# Patient Record
Sex: MALE | Race: BLACK OR AFRICAN AMERICAN | NOT HISPANIC OR LATINO | Employment: UNEMPLOYED | URBAN - METROPOLITAN AREA
[De-identification: names, ages, dates, MRNs, and addresses within clinical notes are randomized per-mention and may not be internally consistent; named-entity substitution may affect disease eponyms.]

---

## 2017-07-22 ENCOUNTER — HOSPITAL ENCOUNTER (EMERGENCY)
Facility: HOSPITAL | Age: 57
Discharge: HOME | End: 2017-07-23
Attending: EMERGENCY MEDICINE
Payer: MEDICAID

## 2017-07-22 DIAGNOSIS — R53.1 WEAKNESS: ICD-10-CM

## 2017-07-22 DIAGNOSIS — R91.1 INCIDENTAL LUNG NODULE: ICD-10-CM

## 2017-07-22 DIAGNOSIS — K59.01 SLOW TRANSIT CONSTIPATION: Primary | ICD-10-CM

## 2017-07-22 DIAGNOSIS — R11.10 EMESIS: ICD-10-CM

## 2017-07-22 LAB
ALBUMIN SERPL BCP-MCNC: 3.6 G/DL
ALLENS TEST: ABNORMAL
ALP SERPL-CCNC: 77 U/L
ALT SERPL W/O P-5'-P-CCNC: 10 U/L
ANION GAP SERPL CALC-SCNC: 15 MMOL/L
AST SERPL-CCNC: 11 U/L
B-OH-BUTYR BLD STRIP-SCNC: 3.8 MMOL/L
BASOPHILS # BLD AUTO: 0 K/UL
BASOPHILS NFR BLD: 0 %
BILIRUB SERPL-MCNC: 1.3 MG/DL
BUN SERPL-MCNC: 31 MG/DL
CALCIUM SERPL-MCNC: 10.1 MG/DL
CHLORIDE SERPL-SCNC: 100 MMOL/L
CO2 SERPL-SCNC: 24 MMOL/L
CREAT SERPL-MCNC: 2 MG/DL
DELSYS: ABNORMAL
DIFFERENTIAL METHOD: ABNORMAL
EOSINOPHIL # BLD AUTO: 0 K/UL
EOSINOPHIL NFR BLD: 0.3 %
ERYTHROCYTE [DISTWIDTH] IN BLOOD BY AUTOMATED COUNT: 14.5 %
EST. GFR  (AFRICAN AMERICAN): 41 ML/MIN/1.73 M^2
EST. GFR  (NON AFRICAN AMERICAN): 35 ML/MIN/1.73 M^2
GLUCOSE SERPL-MCNC: 399 MG/DL
HCO3 UR-SCNC: 28.3 MMOL/L (ref 24–28)
HCT VFR BLD AUTO: 39.3 %
HCT VFR BLD CALC: 43 %PCV (ref 36–54)
HGB BLD-MCNC: 13.2 G/DL
LYMPHOCYTES # BLD AUTO: 2.5 K/UL
LYMPHOCYTES NFR BLD: 36.6 %
MCH RBC QN AUTO: 24 PG
MCHC RBC AUTO-ENTMCNC: 33.6 G/DL
MCV RBC AUTO: 71 FL
MODE: ABNORMAL
MONOCYTES # BLD AUTO: 0.5 K/UL
MONOCYTES NFR BLD: 7 %
NEUTROPHILS # BLD AUTO: 3.8 K/UL
NEUTROPHILS NFR BLD: 56.1 %
PCO2 BLDA: 52.6 MMHG (ref 35–45)
PH SMN: 7.34 [PH] (ref 7.35–7.45)
PLATELET # BLD AUTO: 178 K/UL
PMV BLD AUTO: 11.1 FL
PO2 BLDA: 22 MMHG (ref 40–60)
POC BE: 1 MMOL/L
POC IONIZED CALCIUM: 1.32 MMOL/L (ref 1.06–1.42)
POC SATURATED O2: 33 % (ref 95–100)
POC TCO2: 30 MMOL/L (ref 24–29)
POTASSIUM BLD-SCNC: 4.8 MMOL/L (ref 3.5–5.1)
POTASSIUM SERPL-SCNC: 4.9 MMOL/L
PROT SERPL-MCNC: 7.5 G/DL
RBC # BLD AUTO: 5.51 M/UL
SAMPLE: ABNORMAL
SITE: ABNORMAL
SODIUM BLD-SCNC: 139 MMOL/L (ref 136–145)
SODIUM SERPL-SCNC: 139 MMOL/L
TROPONIN I SERPL DL<=0.01 NG/ML-MCNC: 0.02 NG/ML
WBC # BLD AUTO: 6.7 K/UL

## 2017-07-22 PROCEDURE — 93010 ELECTROCARDIOGRAM REPORT: CPT | Mod: ,,, | Performed by: INTERNAL MEDICINE

## 2017-07-22 PROCEDURE — 82962 GLUCOSE BLOOD TEST: CPT

## 2017-07-22 PROCEDURE — 99285 EMERGENCY DEPT VISIT HI MDM: CPT | Mod: 25

## 2017-07-22 PROCEDURE — 80053 COMPREHEN METABOLIC PANEL: CPT

## 2017-07-22 PROCEDURE — 84484 ASSAY OF TROPONIN QUANT: CPT

## 2017-07-22 PROCEDURE — 25000003 PHARM REV CODE 250: Performed by: EMERGENCY MEDICINE

## 2017-07-22 PROCEDURE — 85025 COMPLETE CBC W/AUTO DIFF WBC: CPT

## 2017-07-22 PROCEDURE — 96361 HYDRATE IV INFUSION ADD-ON: CPT

## 2017-07-22 PROCEDURE — 96374 THER/PROPH/DIAG INJ IV PUSH: CPT

## 2017-07-22 PROCEDURE — 82010 KETONE BODYS QUAN: CPT

## 2017-07-22 PROCEDURE — 93005 ELECTROCARDIOGRAM TRACING: CPT

## 2017-07-22 PROCEDURE — 63600175 PHARM REV CODE 636 W HCPCS: Performed by: EMERGENCY MEDICINE

## 2017-07-22 PROCEDURE — 82803 BLOOD GASES ANY COMBINATION: CPT

## 2017-07-22 RX ORDER — METFORMIN HYDROCHLORIDE 500 MG/1
500 TABLET ORAL 2 TIMES DAILY WITH MEALS
COMMUNITY
End: 2017-07-23

## 2017-07-22 RX ORDER — ONDANSETRON 2 MG/ML
8 INJECTION INTRAMUSCULAR; INTRAVENOUS
Status: COMPLETED | OUTPATIENT
Start: 2017-07-22 | End: 2017-07-22

## 2017-07-22 RX ORDER — GLIPIZIDE 10 MG/1
10 TABLET ORAL
COMMUNITY
End: 2017-07-23

## 2017-07-22 RX ADMIN — SODIUM CHLORIDE 1000 ML: 0.9 INJECTION, SOLUTION INTRAVENOUS at 11:07

## 2017-07-22 RX ADMIN — ONDANSETRON 8 MG: 2 INJECTION INTRAMUSCULAR; INTRAVENOUS at 11:07

## 2017-07-23 VITALS
SYSTOLIC BLOOD PRESSURE: 160 MMHG | WEIGHT: 200 LBS | BODY MASS INDEX: 32.14 KG/M2 | RESPIRATION RATE: 16 BRPM | TEMPERATURE: 98 F | HEIGHT: 66 IN | OXYGEN SATURATION: 99 % | DIASTOLIC BLOOD PRESSURE: 78 MMHG | HEART RATE: 57 BPM

## 2017-07-23 LAB
BACTERIA #/AREA URNS HPF: NORMAL /HPF
BILIRUB UR QL STRIP: NEGATIVE
CLARITY UR: CLEAR
COLOR UR: ABNORMAL
GLUCOSE UR QL STRIP: ABNORMAL
HGB UR QL STRIP: ABNORMAL
KETONES UR QL STRIP: ABNORMAL
LEUKOCYTE ESTERASE UR QL STRIP: NEGATIVE
MICROSCOPIC COMMENT: NORMAL
NITRITE UR QL STRIP: NEGATIVE
PH UR STRIP: 5 [PH] (ref 5–8)
POCT GLUCOSE: 339 MG/DL (ref 70–110)
PROT UR QL STRIP: NEGATIVE
RBC #/AREA URNS HPF: 1 /HPF (ref 0–4)
SP GR UR STRIP: 1.02 (ref 1–1.03)
URN SPEC COLLECT METH UR: ABNORMAL
UROBILINOGEN UR STRIP-ACNC: NEGATIVE EU/DL
WBC #/AREA URNS HPF: 1 /HPF (ref 0–5)
YEAST URNS QL MICRO: NORMAL

## 2017-07-23 PROCEDURE — 81000 URINALYSIS NONAUTO W/SCOPE: CPT

## 2017-07-23 RX ORDER — LACTULOSE 10 G/15ML
20 SOLUTION ORAL 3 TIMES DAILY
Qty: 450 ML | Refills: 0 | Status: SHIPPED | OUTPATIENT
Start: 2017-07-23 | End: 2017-07-28

## 2017-07-23 RX ORDER — METFORMIN HYDROCHLORIDE 500 MG/1
500 TABLET ORAL 2 TIMES DAILY WITH MEALS
Qty: 60 TABLET | Refills: 2 | Status: SHIPPED | OUTPATIENT
Start: 2017-07-23

## 2017-07-23 RX ORDER — METOCLOPRAMIDE 10 MG/1
10 TABLET ORAL EVERY 6 HOURS PRN
Qty: 20 TABLET | Refills: 0 | Status: SHIPPED | OUTPATIENT
Start: 2017-07-23

## 2017-07-23 RX ORDER — LISINOPRIL 10 MG/1
10 TABLET ORAL DAILY
Qty: 30 TABLET | Refills: 2 | Status: SHIPPED | OUTPATIENT
Start: 2017-07-23 | End: 2018-07-08

## 2017-07-23 RX ORDER — GLIPIZIDE 10 MG/1
10 TABLET ORAL
Qty: 30 TABLET | Refills: 2 | Status: SHIPPED | OUTPATIENT
Start: 2017-07-23

## 2017-07-23 NOTE — PLAN OF CARE
[facility] DONE AND REPORTED TO DR. GRAHAM.    Results for RASTA MOHR (MRN [id number])  23:09   Ref. Range 7/22/2017 22:46   POC Sodium Latest Ref Range: 136 - 145 mmol/L 139   POC Potassium Latest Ref Range: 3.5 - 5.1 mmol/L 4.8   POC Ionized Calcium Latest Ref Range: 1.06 - 1.42 mmol/L 1.32   POC Hematocrit Latest Ref Range: 36 - 54 %PCV 43   POC PH Latest Ref Range: 7.35 - 7.45  7.338 (L)   POC PCO2 Latest Ref Range: 35 - 45 mmHg 52.6 (H)   POC PO2 Latest Ref Range: 40 - 60 mmHg 22 (LL)   POC BE Latest Ref Range: -2 to 2 mmol/L 1   POC HCO3 Latest Ref Range: 24 - 28 mmol/L 28.3 (H)   POC SATURATED O2 Latest Ref Range: 95 - 100 % 33 (L)   POC TCO2 Latest Ref Range: 24 - 29 mmol/L 30 (H)   Sample Unknown VENOUS   DelSys Unknown Room Air   Allens Test Unknown N/A   Site Unknown Other   Mode Unknown SPONT

## 2017-07-23 NOTE — ED PROVIDER NOTES
"Encounter Date: 7/22/2017    SCRIBE #1 NOTE: I, [place], am scribing for, and in the presence of,  Stiven Lawler MD. I have scribed the following portions of the note - Other sections scribed: HPI and ROS.       History     Chief Complaint   Patient presents with    Weakness     pt comes to the er not feeling well. he states he has been out of his medications for 2 weeks.      CC: Emesis    HPI: This 57 y.o. male with hx of HTN and DM presents to ED c/o x2 wk hx of emesis with associated nausea and soft stool. Pt reports "not being able to keep anything down." No tx attempted. No alleviating factors. Pt denies hx of peptic ulcers, diarrhea, chest pain, and abdominal pain.     Of note, pt mentions being out of Glipizide for past 2/3 wks.       The history is provided by the patient. No  was used.     Review of patient's allergies indicates:  No Known Allergies  Past Medical History:   Diagnosis Date    Diabetes mellitus     Hypertension      History reviewed. No pertinent surgical history.  Family History   Problem Relation Age of Onset    Diabetes Daughter      Social History   Substance Use Topics    Smoking status: Never Smoker    Smokeless tobacco: Never Used    Alcohol use No     Review of Systems   Constitutional: Negative for diaphoresis and fever.   HENT: Negative for sore throat.    Respiratory: Negative for cough and shortness of breath.    Cardiovascular: Negative for chest pain.   Gastrointestinal: Positive for nausea and vomiting (constant). Negative for abdominal pain and diarrhea.   Genitourinary: Negative for dysuria.   Musculoskeletal: Negative for back pain.   Skin: Negative for rash.   Neurological: Negative for weakness and headaches.   Hematological: Does not bruise/bleed easily.       Physical Exam     Initial Vitals [07/22/17 2011]   BP Pulse Resp Temp SpO2   (!) 173/82 64 16 98.4 °F (36.9 °C) 98 %      MAP       112.33         Physical Exam  Nursing note " and vitals reviewed.  Constitutional:  Uncomfortable nontoxic middle-aged male in no obvious distress  HENT:    Head: NC/AT    Eyes: Conjunctivae normal.   (-) scleral icterus.              Mouth/Throat: Dry mucosal membranes..     Neck: Neck supple, normal rom.   Cardiovascular: RRR  Pulmonary/Chest: CTAB   Abdominal: Soft. ND/NT w/o guarding or rebound.  (-) CVA tenderness.  Musculoskeletal: FROM of all major joints. No extremity edema or tenderness.  Neurological: A&Ox4, Normal speech.  No focal motor deficits.  Normal gait  Skin: Skin is warm and dry.   Psychiatric: normal mood and affect.      ED Course   Procedures  Labs Reviewed   CBC W/ AUTO DIFFERENTIAL - Abnormal; Notable for the following:        Result Value    Hemoglobin 13.2 (*)     Hematocrit 39.3 (*)     MCV 71 (*)     MCH 24.0 (*)     All other components within normal limits   COMPREHENSIVE METABOLIC PANEL - Abnormal; Notable for the following:     Glucose 399 (*)     BUN, Bld 31 (*)     Creatinine 2.0 (*)     Total Bilirubin 1.3 (*)     eGFR if  41 (*)     eGFR if non  35 (*)     All other components within normal limits   BETA - HYDROXYBUTYRATE, SERUM - Abnormal; Notable for the following:     Beta-Hydroxybutyrate 3.8 (*)     All other components within normal limits    Narrative:     Recoll. [id number] by JAMAAL at 07/22/2017 22:51, reason: Lab accident   URINALYSIS - Abnormal; Notable for the following:     Glucose, UA 3+ (*)     Ketones, UA 1+ (*)     Occult Blood UA 1+ (*)     All other components within normal limits   ISTAT PROCEDURE - Abnormal; Notable for the following:     POC PH 7.338 (*)     POC PCO2 52.6 (*)     POC PO2 22 (*)     POC HCO3 28.3 (*)     POC SATURATED O2 33 (*)     POC TCO2 30 (*)     All other components within normal limits   TROPONIN I   URINALYSIS MICROSCOPIC   POCT GLUCOSE MONITORING CONTINUOUS     EKG Readings: (Independently Interpreted)   Initial Reading: No STEMI.   Sinus  bradycardia, rate 47, normal axis/intervals, no ST/T-wave abnormalities.       X-Rays:   Independently Interpreted Readings:   Chest X-Ray: Normal heart size.  No acute abnormalities.  No infiltrates. 1.5 cm nodule probably within the right aspect of the chest wall.   Abdomen:   Renal Stone Study - No acute changes. The small bowel is normal in caliber.  Numerous scattered colonic diverticula are noted.  There is a prominent retained amount of fecal material throughout the visualized colon and rectum.  The appendix is normal.  No obstruction or inflammatory changes.     Medical Decision Making:   History:   Old Medical Records: I decided to obtain old medical records.  Old Records Summarized: records from clinic visits and other records.  Independently Interpreted Test(s):   I have ordered and independently interpreted X-rays - see prior notes.  I have ordered and independently interpreted EKG Reading(s) - see prior notes  Clinical Tests:   Lab Tests: Ordered and Reviewed  Radiological Study: Ordered and Reviewed  Medical Tests: Ordered and Reviewed    Differential diagnosis:   Initial differential diagnosis includes but is not limited to... GERD/peptic ulcer disease, pancreatitis, cholelithiasis and/or cholecystitis, bowel obstruction, ileus, gastroparesis, DKA, ACS.    Additional Medical Decision Making:             Scribe Attestation:   Scribe #1: I performed the above scribed service and the documentation accurately describes the services I performed. I attest to the accuracy of the note.    Attending Attestation:           Physician Attestation for Scribe:  Physician Attestation Statement for Scribe #1: I, Stiven Lawler MD, reviewed documentation, as scribed by [place] in my presence, and it is both accurate and complete.                 ED Course     Clinical Impression:   The primary encounter diagnosis was Slow transit constipation. Diagnoses of Weakness, Emesis, and Incidental lung nodule (1.5cm)  were also pertinent to this visit.    Disposition:   Disposition: Discharged                        Stiven Lawler MD  08/20/17 0158

## 2018-07-08 ENCOUNTER — HOSPITAL ENCOUNTER (EMERGENCY)
Facility: HOSPITAL | Age: 58
Discharge: HOME | End: 2018-07-09
Attending: EMERGENCY MEDICINE
Payer: MEDICAID

## 2018-07-08 VITALS
HEART RATE: 76 BPM | WEIGHT: 214 LBS | BODY MASS INDEX: 28.36 KG/M2 | DIASTOLIC BLOOD PRESSURE: 81 MMHG | HEIGHT: 73 IN | SYSTOLIC BLOOD PRESSURE: 137 MMHG | TEMPERATURE: 99 F | OXYGEN SATURATION: 98 % | RESPIRATION RATE: 18 BRPM

## 2018-07-08 DIAGNOSIS — R22.0 FACIAL SWELLING: Primary | ICD-10-CM

## 2018-07-08 DIAGNOSIS — T78.3XXA ANGIOEDEMA, INITIAL ENCOUNTER: ICD-10-CM

## 2018-07-08 LAB
ALBUMIN SERPL BCP-MCNC: 3.1 G/DL
ALP SERPL-CCNC: 182 U/L
ALT SERPL W/O P-5'-P-CCNC: 19 U/L
ANION GAP SERPL CALC-SCNC: 8 MMOL/L
AST SERPL-CCNC: 23 U/L
BASOPHILS # BLD AUTO: 0.02 K/UL
BASOPHILS NFR BLD: 0.3 %
BILIRUB SERPL-MCNC: 0.5 MG/DL
BUN SERPL-MCNC: 14 MG/DL
CALCIUM SERPL-MCNC: 10.6 MG/DL
CHLORIDE SERPL-SCNC: 101 MMOL/L
CO2 SERPL-SCNC: 27 MMOL/L
CREAT SERPL-MCNC: 1.4 MG/DL
DIFFERENTIAL METHOD: ABNORMAL
EOSINOPHIL # BLD AUTO: 0.1 K/UL
EOSINOPHIL NFR BLD: 0.9 %
ERYTHROCYTE [DISTWIDTH] IN BLOOD BY AUTOMATED COUNT: 15.3 %
EST. GFR  (AFRICAN AMERICAN): >60 ML/MIN/1.73 M^2
EST. GFR  (NON AFRICAN AMERICAN): 54 ML/MIN/1.73 M^2
GLUCOSE SERPL-MCNC: 93 MG/DL
HCT VFR BLD AUTO: 26.9 %
HGB BLD-MCNC: 8.7 G/DL
LYMPHOCYTES # BLD AUTO: 2.4 K/UL
LYMPHOCYTES NFR BLD: 36.3 %
MCH RBC QN AUTO: 22.6 PG
MCHC RBC AUTO-ENTMCNC: 32.3 G/DL
MCV RBC AUTO: 70 FL
MONOCYTES # BLD AUTO: 0.6 K/UL
MONOCYTES NFR BLD: 8.7 %
NEUTROPHILS # BLD AUTO: 3.6 K/UL
NEUTROPHILS NFR BLD: 53.8 %
PLATELET # BLD AUTO: 398 K/UL
PMV BLD AUTO: 8.4 FL
POCT GLUCOSE: 96 MG/DL (ref 70–110)
POTASSIUM SERPL-SCNC: 4.1 MMOL/L
PROT SERPL-MCNC: 7.2 G/DL
RBC # BLD AUTO: 3.85 M/UL
SODIUM SERPL-SCNC: 136 MMOL/L
WBC # BLD AUTO: 6.67 K/UL

## 2018-07-08 PROCEDURE — 96374 THER/PROPH/DIAG INJ IV PUSH: CPT

## 2018-07-08 PROCEDURE — 25000003 PHARM REV CODE 250: Performed by: EMERGENCY MEDICINE

## 2018-07-08 PROCEDURE — S0028 INJECTION, FAMOTIDINE, 20 MG: HCPCS | Performed by: EMERGENCY MEDICINE

## 2018-07-08 PROCEDURE — 99284 EMERGENCY DEPT VISIT MOD MDM: CPT | Mod: 25

## 2018-07-08 PROCEDURE — 80053 COMPREHEN METABOLIC PANEL: CPT

## 2018-07-08 PROCEDURE — 82962 GLUCOSE BLOOD TEST: CPT

## 2018-07-08 PROCEDURE — 85025 COMPLETE CBC W/AUTO DIFF WBC: CPT

## 2018-07-08 PROCEDURE — 63600175 PHARM REV CODE 636 W HCPCS: Performed by: EMERGENCY MEDICINE

## 2018-07-08 RX ORDER — METHYLPREDNISOLONE SOD SUCC 125 MG
125 VIAL (EA) INJECTION
Status: COMPLETED | OUTPATIENT
Start: 2018-07-08 | End: 2018-07-08

## 2018-07-08 RX ORDER — FAMOTIDINE 10 MG/ML
20 INJECTION INTRAVENOUS
Status: COMPLETED | OUTPATIENT
Start: 2018-07-08 | End: 2018-07-08

## 2018-07-08 RX ORDER — DIPHENHYDRAMINE HYDROCHLORIDE 50 MG/ML
50 INJECTION INTRAMUSCULAR; INTRAVENOUS
Status: COMPLETED | OUTPATIENT
Start: 2018-07-08 | End: 2018-07-08

## 2018-07-08 RX ADMIN — DIPHENHYDRAMINE HYDROCHLORIDE 50 MG: 50 INJECTION, SOLUTION INTRAMUSCULAR; INTRAVENOUS at 10:07

## 2018-07-08 RX ADMIN — METHYLPREDNISOLONE SODIUM SUCCINATE 125 MG: 125 INJECTION, POWDER, FOR SOLUTION INTRAMUSCULAR; INTRAVENOUS at 10:07

## 2018-07-08 RX ADMIN — FAMOTIDINE 20 MG: 10 INJECTION INTRAVENOUS at 10:07

## 2018-07-09 RX ORDER — PREDNISONE 20 MG/1
40 TABLET ORAL DAILY
Qty: 10 TABLET | Refills: 0 | Status: SHIPPED | OUTPATIENT
Start: 2018-07-09 | End: 2018-07-14

## 2018-07-09 RX ORDER — DIPHENHYDRAMINE HCL 25 MG
50 CAPSULE ORAL EVERY 4 HOURS PRN
Qty: 20 CAPSULE | Refills: 0 | Status: SHIPPED | OUTPATIENT
Start: 2018-07-09

## 2018-07-09 RX ORDER — FAMOTIDINE 20 MG/1
20 TABLET, FILM COATED ORAL 2 TIMES DAILY
Qty: 10 TABLET | Refills: 0 | Status: SHIPPED | OUTPATIENT
Start: 2018-07-09 | End: 2018-07-14

## 2018-07-09 NOTE — ED TRIAGE NOTES
Patient presents to the ER with spouse via personal vehicle. Patient presents with facial swelling. Patient stated that symptoms started around 0300 this morning. Patient is taking lisinopril for hypertension. Denies pain, denies any swelling to the throat or tongue

## 2018-07-09 NOTE — ED PROVIDER NOTES
Encounter Date: 7/8/2018    SCRIBE #1 NOTE: I, Nannette Mendoza, am scribing for, and in the presence of,  Bettye Feliciano MD. I have scribed the following portions of the note - Other sections scribed: ROS, HPI and PE.       History     Chief Complaint   Patient presents with    Facial Swelling     pt with swelling to mouth/lips that today; gotten worse as the day went on. denies any trouble breathing; denies tongue feeling bigger. has taken (4) 25mg benadryl PTA; (2) at 1500, (2) at 1800.     CC: Facial Swelling    HPI: This 58 y.o. male with DM2 and HTN presents to the ED complaining of an acute constant moderate facial swelling which he noticed this morning at 3 AM when he went to use the bathroom. Swelling initially began to his left face around his mouth and gradually has spread to his R side face now. Denies SOB, trouble swallowing, trouble in speaking, choking, drooling, or any other related sx. No prior episodes. No food or cosmetic allergies. No new foods recently. Took benadryl 25mg OTC capsules x2 at 03:00 and x2 at 06:00 without relief.    Patient takes Aspirin 81mg PO qday, Famotidine 20mg PO BID, Oxycodone-APAP 7.5-325 PO q6hrs PRN pain, Glipizide 10mg PO BID with meals, Metformin 500mg PO BID with meals, and Lisinopril 10mg PO qday (since 2004).    Patient had a work accident in June (1 month ago) that resulted in his being a trauma activation at [facility] for open fracture of R femur. He has had surgery on it but is currently nonweightbearing and in a wheelchair.      The history is provided by the patient. No  was used.     Review of patient's allergies indicates:  No Known Allergies  Past Medical History:   Diagnosis Date    Diabetes mellitus     Hypertension      Past Surgical History:   Procedure Laterality Date    LEG SURGERY       Family History   Problem Relation Age of Onset    Diabetes Daughter      Social History   Substance Use Topics    Smoking status: Never Smoker     Smokeless tobacco: Never Used    Alcohol use No     Review of Systems   Constitutional: Negative for chills and fever.   HENT: Positive for facial swelling. Negative for ear pain, sore throat, trouble swallowing and voice change.    Eyes: Negative for pain.   Respiratory: Negative for cough and shortness of breath.    Cardiovascular: Negative for chest pain.   Gastrointestinal: Negative for abdominal pain, diarrhea, nausea and vomiting.   Genitourinary: Negative for dysuria.   Musculoskeletal: Positive for arthralgias. Negative for neck stiffness.        R hip / femur pain (postop)   Skin: Negative for rash.   Neurological: Negative for headaches.       Physical Exam     Initial Vitals [07/08/18 2129]   BP Pulse Resp Temp SpO2   126/82 74 18 98.9 °F (37.2 °C) 98 %      MAP       --         Physical Exam    Nursing note and vitals reviewed.  Constitutional: He appears well-developed and well-nourished. He is not diaphoretic. No distress.   Awake, alert, nontoxic. No muffled voice or drooling.   HENT:   Head: Normocephalic and atraumatic.   Diffuse mild upper and lower lip swelling. No significant tongue swelling. Poor dentition but no tenderness to palpation. No intraoral pus. No trismus. No facial masses or crepitus.   Eyes: Conjunctivae and EOM are normal. Pupils are equal, round, and reactive to light.   Neck: Normal range of motion. Neck supple.   Cardiovascular: Normal rate, regular rhythm, normal heart sounds and intact distal pulses.   Pulmonary/Chest: Breath sounds normal. No respiratory distress. He has no wheezes. He has no rhonchi. He has no rales.   Abdominal: Soft. Bowel sounds are normal. He exhibits no distension. There is no tenderness. There is no rebound and no guarding.   Musculoskeletal: He exhibits tenderness. He exhibits no edema.   R hip TTP (postop).   Neurological: He is alert and oriented to person, place, and time. He has normal strength.   Skin: Skin is warm and dry.   Psychiatric: He has  a normal mood and affect.         ED Course   Procedures  Labs Reviewed - No data to display       Imaging Results    None          Medical Decision Making:   History:   Old Medical Records: I decided to obtain old medical records.  Old Records Summarized: records from previous admission(s) and records from another hospital.  Initial Assessment:   58 y.o. Male with diffuse facial swelling since 03:00 today. No new foods/cosmetics/trauma. On lisinopril since 2004.  Differential Diagnosis:   Ddx includes allergic reaction, ACEi-induced angioedema, impending airway compromise, other.  Clinical Tests:   Lab Tests: Ordered and Reviewed  ED Management:  Patient tx'ed as ACEi-induced angioedema with IV diphenhydramine, famotidine, methylprednisolone.     Patient observed in ED until 00:30 (arrived around 21:24) with mild improvement in facial swelling. No significant tongue swelling. No change to voice or difficulty handling secretions. Tolerating PO.     I discussed ED/OBS vs d/c with return precautions and patient stated he wanted to go home. Urged to return for worsening swelling, voice change, trouble swallowing, or other evidence of impending airway emergency. Patient and wife educated on angioedema and need to avoid lisinopril and related medications in the future.             Scribe Attestation:   Scribe #1: I performed the above scribed service and the documentation accurately describes the services I performed. I attest to the accuracy of the note.    Attending Attestation:           Physician Attestation for Scribe:  Physician Attestation Statement for Scribe #1: I, Bettye Feliciano MD, reviewed documentation, as scribed by Nannette Mendoza in my presence, and it is both accurate and complete.                    Clinical Impression:   The primary encounter diagnosis was Facial swelling. A diagnosis of Angioedema, initial encounter was also pertinent to this visit.              Bettye Feliciano MD  07/10/18 1254

## 2019-06-30 RX ORDER — METFORMIN HYDROCHLORIDE 1000 MG/1
TABLET ORAL
Qty: 180 TABLET | Refills: 0 | OUTPATIENT
Start: 2019-06-30

## 2019-07-09 RX ORDER — ATORVASTATIN CALCIUM 40 MG/1
TABLET, FILM COATED ORAL
Qty: 90 TABLET | Refills: 0 | OUTPATIENT
Start: 2019-07-09

## 2019-07-09 RX ORDER — GLIPIZIDE 10 MG/1
TABLET ORAL
Qty: 180 TABLET | Refills: 0 | OUTPATIENT
Start: 2019-07-09

## 2019-07-09 RX ORDER — METFORMIN HYDROCHLORIDE 1000 MG/1
TABLET ORAL
Qty: 180 TABLET | Refills: 0 | OUTPATIENT
Start: 2019-07-09

## 2019-07-31 RX ORDER — HYDROCHLOROTHIAZIDE 25 MG/1
TABLET ORAL
Qty: 90 TABLET | Refills: 0 | OUTPATIENT
Start: 2019-07-31

## 2020-03-19 RX ORDER — METFORMIN HYDROCHLORIDE 1000 MG/1
TABLET ORAL
Qty: 180 TABLET | OUTPATIENT
Start: 2020-03-19